# Patient Record
Sex: MALE | Race: WHITE | NOT HISPANIC OR LATINO | Employment: FULL TIME | URBAN - METROPOLITAN AREA
[De-identification: names, ages, dates, MRNs, and addresses within clinical notes are randomized per-mention and may not be internally consistent; named-entity substitution may affect disease eponyms.]

---

## 2020-11-25 ENCOUNTER — TELEPHONE (OUTPATIENT)
Dept: SCHEDULING | Facility: IMAGING CENTER | Age: 30
End: 2020-11-25

## 2020-12-17 ENCOUNTER — OFFICE VISIT (OUTPATIENT)
Dept: MEDICAL GROUP | Facility: LAB | Age: 30
End: 2020-12-17
Payer: COMMERCIAL

## 2020-12-17 VITALS
BODY MASS INDEX: 23.19 KG/M2 | DIASTOLIC BLOOD PRESSURE: 80 MMHG | SYSTOLIC BLOOD PRESSURE: 134 MMHG | TEMPERATURE: 99 F | OXYGEN SATURATION: 98 % | RESPIRATION RATE: 13 BRPM | WEIGHT: 162 LBS | HEIGHT: 70 IN | HEART RATE: 78 BPM

## 2020-12-17 DIAGNOSIS — Z76.89 ENCOUNTER TO ESTABLISH CARE: ICD-10-CM

## 2020-12-17 DIAGNOSIS — Z23 NEED FOR VACCINATION: ICD-10-CM

## 2020-12-17 DIAGNOSIS — R25.1 TREMOR: ICD-10-CM

## 2020-12-17 PROCEDURE — 99204 OFFICE O/P NEW MOD 45 MIN: CPT | Mod: 25 | Performed by: FAMILY MEDICINE

## 2020-12-17 PROCEDURE — 90472 IMMUNIZATION ADMIN EACH ADD: CPT | Performed by: FAMILY MEDICINE

## 2020-12-17 PROCEDURE — 90686 IIV4 VACC NO PRSV 0.5 ML IM: CPT | Performed by: FAMILY MEDICINE

## 2020-12-17 PROCEDURE — 90471 IMMUNIZATION ADMIN: CPT | Performed by: FAMILY MEDICINE

## 2020-12-17 PROCEDURE — 90715 TDAP VACCINE 7 YRS/> IM: CPT | Performed by: FAMILY MEDICINE

## 2020-12-17 SDOH — HEALTH STABILITY: MENTAL HEALTH: HOW MANY STANDARD DRINKS CONTAINING ALCOHOL DO YOU HAVE ON A TYPICAL DAY?: 1 OR 2

## 2020-12-17 SDOH — HEALTH STABILITY: MENTAL HEALTH: HOW OFTEN DO YOU HAVE 6 OR MORE DRINKS ON ONE OCCASION?: WEEKLY

## 2020-12-17 SDOH — HEALTH STABILITY: MENTAL HEALTH: HOW OFTEN DO YOU HAVE A DRINK CONTAINING ALCOHOL?: 4 OR MORE TIMES A WEEK

## 2020-12-17 ASSESSMENT — ENCOUNTER SYMPTOMS
DEPRESSION: 0
CONSTIPATION: 0
BLURRED VISION: 0
WHEEZING: 0
SHORTNESS OF BREATH: 0
PALPITATIONS: 0
HEADACHES: 0
NAUSEA: 0
DIARRHEA: 0
CHILLS: 0
NERVOUS/ANXIOUS: 0
ABDOMINAL PAIN: 0
VOMITING: 0
FEVER: 0

## 2020-12-17 NOTE — PROGRESS NOTES
"Ernie Wong is a 30 y.o. male here for   Chief Complaint   Patient presents with   • Establish Care   • Skin Discoloration     X 10 spots on skin, wants evaluation    • Patient Question     X july, Trache, stoped drinking, started being healthy, started b complex, shaky, after eating gets watery eyes, stopped drinking energy drinks.        HPI:  Ernie is a very pleasant 30 y.o. male.     #Establish care   -Reviewed all past medical history, family history, social history.  -Reviewed all screening/vaccinations:   -Diet and Exercise:   -Tobacco, alcohol, recreational drug use:   -Sexually active:   -Occupation:       #Tremor           Current medicines (including changes today)  No current outpatient medications on file.     No current facility-administered medications for this visit.      He  has no past medical history on file.  He  has no past surgical history on file.  Social History     Tobacco Use   • Smoking status: Never Smoker   • Smokeless tobacco: Current User     Types: Chew   Substance Use Topics   • Alcohol use: Yes     Alcohol/week: 1.2 oz     Types: 2 Shots of liquor per week     Frequency: 4 or more times a week     Drinks per session: 1 or 2     Binge frequency: Weekly   • Drug use: Not Currently     Social History     Social History Narrative   • Not on file     No family history on file.  No family status information on file.         ROS  ROS     Objective:     /80 (BP Location: Right arm, Patient Position: Sitting, BP Cuff Size: Adult)   Pulse 78   Temp 37.2 °C (99 °F) (Temporal)   Resp 13   Ht 1.778 m (5' 10\")   Wt 73.5 kg (162 lb)   SpO2 98%  Body mass index is 23.24 kg/m².  Physical Exam:    Constitutional: Alert, no distress.  Skin: Warm, dry, good turgor, no rashes in visible areas.  Eye: Equal, round and reactive, conjunctiva clear, lids normal.  ENMT: Lips without lesions, good dentition, oropharynx clear. TM's pearly gray with normal light reflexes bilaterally  Neck: " Trachea midline, no masses, no thyromegaly. No cervical or supraclavicular lymphadenopathy.  Respiratory: Unlabored respiratory effort, lungs clear to auscultation bilaterally, no wheezes, rales, or ronchi.  Cardiovascular: Normal S1, S2, RRR, no murmur, no edema.  Abdomen: Soft, non-tender, no masses, no hepatosplenomegaly.  Psych: Alert and oriented x3, normal affect and mood.  ***      Assessment and Plan:   The following treatment plan was discussed    There are no diagnoses linked to this encounter.    Records requested.  Followup: No follow-ups on file.         This note was created using voice recognition software. I have made every reasonable attempt to correct errors, however, I do anticipate some grammatical errors.

## 2020-12-17 NOTE — PROGRESS NOTES
Subjective:   Ernie Wong is a 30 y.o. male here today for   Chief Complaint   Patient presents with   • Establish Care   • Skin Discoloration     X 10 spots on skin, wants evaluation    • Patient Question     X july, Trache, stoped drinking, started being healthy, started b complex, shaky, after eating gets watery eyes, stopped drinking energy drinks.        #Establish care   -Reviewed all past medical history, family history, social history.  -Reviewed all screening/vaccinations: Patient due for influenza, Tdap at this time.  -Diet and Exercise: Appropriate for age.  -Tobacco, alcohol, recreational drug use: Any tobacco, vaping, recreational drug use.  Occasional alcohol use.  -Sexually active: Yes, monogamous with wife, no concerns regarding relationship.  -Occupation: Construction    #Tremors:  -Patient states that for the last 4 to 5 months he has noticed tremors in his hands.  He states his only place has been having tremors.  They started after beginning a vitamin regimen with vitamin B12.  He states that they are intermittent, usually worse at the end of the day.  He states he normally does not know some during work.  He states that that will shake regardless of whether sitting still or moving.  Worse with increased anxiety, hunger, emotions.  He denies any numbness, tingling, weakness in hands.  Denies any difficulty walking, denies any urinary incontinence.  No previous injury.  No significant family history of any neuromuscular disorders.      No Known Allergies      Current medicines (including changes today)  No current outpatient medications on file.     No current facility-administered medications for this visit.      He  has no past medical history on file.    ROS   Review of Systems   Constitutional: Negative for chills and fever.   HENT: Negative for hearing loss.    Eyes: Negative for blurred vision.   Respiratory: Negative for shortness of breath and wheezing.    Cardiovascular: Negative for  "chest pain and palpitations.   Gastrointestinal: Negative for abdominal pain, constipation, diarrhea, nausea and vomiting.   Neurological: Negative for headaches.   Psychiatric/Behavioral: Negative for depression. The patient is not nervous/anxious.         Objective:     Physical Exam:  /80 (BP Location: Right arm, Patient Position: Sitting, BP Cuff Size: Adult)   Pulse 78   Temp 37.2 °C (99 °F) (Temporal)   Resp 13   Ht 1.778 m (5' 10\")   Wt 73.5 kg (162 lb)   SpO2 98%  Body mass index is 23.24 kg/m².   Constitutional: Alert, no distress.  Skin: Warm, dry, good turgor, no rashes in visible areas.  Eye: Equal, round and reactive, conjunctiva clear, lids normal.  ENMT: TM's clear bilaterally, lips without lesions, good dentition, oropharynx clear.  Neck: Trachea midline, no masses, no thyromegaly. No cervical or supraclavicular lymphadenopathy.  Respiratory: Unlabored respiratory effort, lungs clear to auscultation, no wheezes, no rhonchi.  Cardiovascular: Normal S1, S2, no murmur, no edema.  Abdomen: Soft, non-tender, no masses, no hepatosplenomegaly.  Psych: Alert and oriented x3, normal affect and mood.  Neuro: Cranial nerves I through XII intact.  Skill skeletal: Strength in hands, wrists are 5/5 elbow flexion, extension, deviation both ulnar radial aspects.  Patient is unremarkable.  Range of motion is normal.  There is a very mild high-frequency tremor noted in his right hand.     Assessment and Plan:     1. Encounter to establish care  -All questions concerns were answered at this time.  -Vaccinations/screenings needed at this time: Influenza, Tdap  -Labs reviewed, will check labs as below.   -Discussed the importance of a healthy, Mediterranean style diet, routine exercise regimen.  -Discussed general safety measures including seatbelts, helmets, avoidance of smoking, sunscreen, hydration.  -Follow-up for general physical exam on a yearly basis, sooner if needed.    2. Tremor  -Most likely benign " essential tremor given symptoms presenting.  Physical examination mostly benign.  We will check labs to rule out any other causes.  -Return precautions were given, patient will follow-up as needed.  - CBC WITHOUT DIFFERENTIAL; Future  - Comp Metabolic Panel; Future  - TSH WITH REFLEX TO FT4; Future    3. Need for vaccination  -Patient was agreeable to receiving the Influenza, Tdap vaccine in clinic today after discussion of potential risks, benefits, and side effects. Vaccine was administered without adverse effects.  - Tdap Vaccine =>8YO IM  - Influenza Vaccine Quad Injection (PF)      Followup: Return in about 1 year (around 12/17/2021), or if symptoms worsen or fail to improve.         PLEASE NOTE: This dictation was created using voice recognition software. I have made every reasonable attempt to correct obvious errors, but I expect that there are errors of grammar and possibly content that I did not discover before finalizing the note.

## 2021-03-06 ENCOUNTER — OFFICE VISIT (OUTPATIENT)
Dept: URGENT CARE | Facility: PHYSICIAN GROUP | Age: 31
End: 2021-03-06
Payer: COMMERCIAL

## 2021-03-06 VITALS
OXYGEN SATURATION: 98 % | BODY MASS INDEX: 23.91 KG/M2 | TEMPERATURE: 99.4 F | WEIGHT: 167 LBS | HEIGHT: 70 IN | HEART RATE: 92 BPM | RESPIRATION RATE: 20 BRPM | SYSTOLIC BLOOD PRESSURE: 126 MMHG | DIASTOLIC BLOOD PRESSURE: 84 MMHG

## 2021-03-06 DIAGNOSIS — T15.91XA FOREIGN BODY OF RIGHT EYE, INITIAL ENCOUNTER: ICD-10-CM

## 2021-03-06 DIAGNOSIS — S05.01XA ABRASION OF RIGHT CORNEA, INITIAL ENCOUNTER: ICD-10-CM

## 2021-03-06 PROCEDURE — 65205 REMOVE FOREIGN BODY FROM EYE: CPT | Performed by: PHYSICIAN ASSISTANT

## 2021-03-06 PROCEDURE — 99213 OFFICE O/P EST LOW 20 MIN: CPT | Mod: 25 | Performed by: PHYSICIAN ASSISTANT

## 2021-03-06 RX ORDER — POLYMYXIN B SULFATE AND TRIMETHOPRIM 1; 10000 MG/ML; [USP'U]/ML
1 SOLUTION OPHTHALMIC EVERY 4 HOURS
Qty: 10 ML | Refills: 0 | Status: SHIPPED | OUTPATIENT
Start: 2021-03-06 | End: 2021-03-16

## 2021-03-07 ASSESSMENT — ENCOUNTER SYMPTOMS
SHORTNESS OF BREATH: 0
HEADACHES: 0
EYE DISCHARGE: 0
SORE THROAT: 0
BLURRED VISION: 0
FEVER: 0
SINUS PAIN: 0
FOREIGN BODY SENSATION: 1
COUGH: 0
CHILLS: 0
EYE REDNESS: 1
EYE PAIN: 1
PALPITATIONS: 0
PHOTOPHOBIA: 0
DOUBLE VISION: 0

## 2021-03-07 NOTE — PROGRESS NOTES
"Subjective:   Ernie Wong is a 30 y.o. male who presents for Eye Pain (right eye discomfort( 'pt states that it feels like something in his eye or maybe scrathed it\" )      Eye Pain   The right eye is affected. This is a new problem. The current episode started today. The problem has been unchanged. The injury mechanism was a foreign body. The pain is severe. He does not wear contacts. Associated symptoms include eye redness and a foreign body sensation. Pertinent negatives include no blurred vision, eye discharge, double vision, fever or photophobia. He has tried commercial eye wash for the symptoms. The treatment provided no relief.       Review of Systems   Constitutional: Negative for chills, fever and malaise/fatigue.   HENT: Negative for congestion, ear pain, sinus pain and sore throat.    Eyes: Positive for pain and redness. Negative for blurred vision, double vision, photophobia and discharge.   Respiratory: Negative for cough and shortness of breath.    Cardiovascular: Negative for chest pain and palpitations.   Skin: Negative for rash.   Neurological: Negative for headaches.   All other systems reviewed and are negative.      Medications:    • polymixin-trimethoprim Soln    Allergies: Patient has no known allergies.    Problem List: Ernie Wong does not have a problem list on file.    Surgical History:  No past surgical history on file.    Past Social Hx: Ernie Wong  reports that he has never smoked. His smokeless tobacco use includes chew. He reports current alcohol use of about 1.2 oz of alcohol per week. He reports previous drug use.     Past Family Hx:  Ernie Wong family history is not on file.     Problem list, medications, and allergies reviewed by myself today in Epic.     Objective:     Blood Pressure 126/84 (BP Location: Left arm, Patient Position: Sitting, BP Cuff Size: Adult)   Pulse 92   Temperature 37.4 °C (99.4 °F) (Temporal)   Respiration 20   Height 1.778 m (5' 10\")   " Weight 75.8 kg (167 lb)   Oxygen Saturation 98%   Body Mass Index 23.96 kg/m²     Physical Exam  Vitals reviewed.   Constitutional:       General: He is not in acute distress.     Appearance: He is well-developed. He is not ill-appearing or toxic-appearing.   HENT:      Head: Normocephalic and atraumatic.      Right Ear: Hearing, tympanic membrane, ear canal and external ear normal.      Left Ear: Hearing, tympanic membrane, ear canal and external ear normal.      Nose: Nose normal.      Mouth/Throat:      Pharynx: Uvula midline. No oropharyngeal exudate.   Eyes:      General: Lids are normal. Lids are everted, no foreign bodies appreciated.      Extraocular Movements: Extraocular movements intact.      Conjunctiva/sclera:      Right eye: Right conjunctiva is injected.      Pupils: Pupils are equal, round, and reactive to light.      Right eye: Corneal abrasion and fluorescein uptake present.      Slit lamp exam:     Right eye: Foreign body present.   Cardiovascular:      Rate and Rhythm: Regular rhythm.      Heart sounds: Normal heart sounds. No murmur. No friction rub. No gallop.    Pulmonary:      Effort: Pulmonary effort is normal. No respiratory distress.      Breath sounds: Normal breath sounds. No decreased breath sounds, wheezing or rales.   Chest:      Chest wall: No tenderness.   Musculoskeletal:         General: No tenderness or deformity. Normal range of motion.      Cervical back: Full passive range of motion without pain, normal range of motion and neck supple.   Skin:     General: Skin is warm and dry.      Findings: No rash.   Neurological:      Mental Status: He is alert and oriented to person, place, and time.   Psychiatric:         Speech: Speech normal.         Behavior: Behavior normal.         Thought Content: Thought content normal.         Judgment: Judgment normal.       Procedure: Foreign Body Removal of the right eye    -Risks, benefits ,and alternatives discussed.   -Local anesthesia  achieved with 1-2 drops of proparacaine  -Fundoscopic exam shows no abnormalities  -Upper/lower eyelids inverted and swept for any FB   -Fluorescein exam done highlighting FB and any abrasions present  -Foreign body removed with cotton tip applicator   -Irrigation of eye with NS  -Patient tolerated well    Assessment/Plan:     Medical Decision Making/Comments     Pt is a 30 yr old male who presents for evaluation of a painful red eye.  Pt states he believes something fell into his right eye.  Pt denies change in vision, headache, vomiting, or contact lens use.  Vision acuity:unchanged  Affected eye is injected with.  Fluorescein shows abrasion at 12 and 3 o'clock.  FB found under the right upper eyelid and removed.  Affected eye shows pupils equal and reactive to light.  Cornea is clear without haziness or blood.  Full extraocular motion without pain.  There is no ciliary flush or consensual photophobia.  There is no edema, erythema or tenderness of the surrounding orbit.           Diagnosis and associated orders     1. Foreign body of right eye, initial encounter     2. Abrasion of right cornea, initial encounter                Differential diagnosis, natural history, supportive care, and indications for immediate follow-up discussed.    Advised the patient to follow-up with the primary care physician for recheck, reevaluation, and consideration of further management.    Please note that this dictation was created using voice recognition software. I have made a reasonable attempt to correct obvious errors, but I expect that there are errors of grammar and possibly content that I did not discover before finalizing the note.